# Patient Record
Sex: MALE | Race: BLACK OR AFRICAN AMERICAN | NOT HISPANIC OR LATINO | Employment: STUDENT | URBAN - METROPOLITAN AREA
[De-identification: names, ages, dates, MRNs, and addresses within clinical notes are randomized per-mention and may not be internally consistent; named-entity substitution may affect disease eponyms.]

---
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
## 2024-07-02 ENCOUNTER — PATIENT OUTREACH (OUTPATIENT)
Dept: CARE COORDINATION | Facility: CLINIC | Age: 19
End: 2024-07-02
Payer: COMMERCIAL

## 2024-09-14 ENCOUNTER — HEALTH MAINTENANCE LETTER (OUTPATIENT)
Age: 19
End: 2024-09-14

## 2024-09-19 NOTE — PATIENT INSTRUCTIONS
"Acne vulgaris    Morning regimen:    *Wash with either a gentle cleanser such as Cetaphil or Cera Ve unmedicated gentle cleanser.     *Then apply clindamycin 1% lotion to the entire face, as well as chest and back if affected.     *Apply a gentle moisturizer with SPF 30+ that says either \"noncomedogenic\" or \"oil free\" meaning it won't clog pores.     Evening regimen:    *Wash with either a gentle cleanser such as Cetaphil gentle cleanser     *Allow skin to fully dry again before applying medications. Applying retinods to moist skin will cause added dryness.     *Apply prescription retinoid (tretinoin, adapalene or Differin. Whichever you were prescribed) A pea sized amount will be enough for the entire face. More is not better, it will just add to the dryness. Apply to chest and back areas as well if affected. This is not a spot treatment so make sure you're covering the entire area that is affected by acne. When you're first starting a retinoid you WILL be dry and may have some flaking of the skin. This is an expected response so keep using the medication. If the dryness is not controlled with moisturizing often, you may want to decrease how often you use it temporarially and slowly increase the frequency to nightly use as the dryness subsides. For most people I would recommend starting use every 3rd night for a few weeks, then increasing to every other night for a few weeks then when you feel like you can tolerate it go up to nightly use. This should help the dryness.     *Apply a gentle moisturizing CREAM. Cera Ve cream, Cetaphil Cream, or Vanicream are good options.         It may take 2-3 months to see 50-70% improvement. It is important to give the topical medications time to clean out your pores and prevent new acne from forming. Sometimes people flare with more acne after starting a new regimen and this is okay. If the flare is severe please call our office and speak with a nurse about your acne.  Otherwise, " please continue use of this regimen for the next 3 months and come back for a reevaluation with me and we can adjust your plan at that time.       ------------------------------------------------------------------------------------    # tinea versicolor  Start ketoconazole 2% shampoo to use daily as a body wash to the neck, chest and back until rash is fully resolved. May use as maintenance to keep rash from recurring 2-3 times weekly if rash seems to come and go, or use with rash recurrence only. Shampoo may be drying, okay to moisturize after bathing or throughout the day for irritation. Sun exposure/tan skin will make rash more obvious as affected areas may stay hypopigmented. Advised not to wear shirts more than once without washing while rash is present.

## 2024-09-20 ENCOUNTER — OFFICE VISIT (OUTPATIENT)
Dept: DERMATOLOGY | Facility: CLINIC | Age: 19
End: 2024-09-20
Payer: COMMERCIAL

## 2024-09-20 DIAGNOSIS — B36.0 TV (TINEA VERSICOLOR): ICD-10-CM

## 2024-09-20 DIAGNOSIS — L70.0 ACNE VULGARIS: Primary | ICD-10-CM

## 2024-09-20 PROCEDURE — G2211 COMPLEX E/M VISIT ADD ON: HCPCS | Performed by: NURSE PRACTITIONER

## 2024-09-20 PROCEDURE — 99214 OFFICE O/P EST MOD 30 MIN: CPT | Performed by: NURSE PRACTITIONER

## 2024-09-20 RX ORDER — TRETINOIN 0.25 MG/G
CREAM TOPICAL
Qty: 45 G | Refills: 11 | Status: CANCELLED | OUTPATIENT
Start: 2024-09-20

## 2024-09-20 RX ORDER — TRETINOIN 0.25 MG/G
CREAM TOPICAL
Qty: 45 G | Refills: 11 | Status: SHIPPED | OUTPATIENT
Start: 2024-09-20

## 2024-09-20 RX ORDER — KETOCONAZOLE 20 MG/ML
SHAMPOO TOPICAL
Qty: 120 ML | Refills: 11 | Status: SHIPPED | OUTPATIENT
Start: 2024-09-20

## 2024-09-20 RX ORDER — CLINDAMYCIN PHOSPHATE 10 UG/ML
LOTION TOPICAL EVERY MORNING
Qty: 60 ML | Refills: 11 | Status: SHIPPED | OUTPATIENT
Start: 2024-09-20 | End: 2024-10-01

## 2024-09-20 NOTE — PROGRESS NOTES
[facility] Dermatology Note  Encounter Date: Sep 20, 2024  Office Visit     Reviewed patients past medical history and pertinent chart review prior to patients visit today.     Dermatology Problem List:  Acne  -Previously had been on doxycycline 100 mg twice daily and benzyl peroxide wash which have both been discontinued  -Current treatment includes clindamycin lotion, tretinoin 0.025% cream  Tinea versicolor  -ketoconazole shampoo  ____________________________________________    Assessment & Plan:     # Acne vulgaris    -Continue clindamycin lotion once daily in the am.  -restart either adapalene 0.1% gel or tretinoin 0.025% cream. Apply once every other day and increase to nightly as tolerate.  Waiting a few minutes after washing affected areas will decrease irritation. Method of application, side effects and expected results were discussed. The patient will apply pea size amount to the entire face, avoid areas around the eyes, corners of nose and mouth. Discussed side effects including photosensitivity and irritation.    -start gentle cleanser  -start SPF moisturizer. Samples given.     # tinea versicolor  Discussed diagnosis and treatment options with patient. Prescribed ketoconazole 2% shampoo to use daily as a body wash to the neck, chest and back until rash is fully resolved. May use as maintenance to keep rash from recurring 2-3 times weekly if rash seems to come and go, or use with rash recurrence only. Shampoo may be drying, okay to moisturize after bathing or throughout the day for irritation. Sun exposure/tan skin will make rash more obvious as affected areas may stay hypopigmented. Advised not to wear shirts/bras more than once without washing while rash is present.      Follow-up: 3 months for follow up if not well controlled. 1 year if well controlled on topical therapy only, sooner PRN new concerns    Nany Saavedra CNP  Dermatology   _______________________________________    CC: No  chief complaint on file.    HPI:  Mr. Rani Singh is a(n) 18 year old male who presents today for follow-up  for acne. He saw Rachel Ashford in November 2022 and was started on doxycycline 100 mg BID, tretinoin 0.025% cream and BPO wash.  At follow-up with me on 4/14/2023 I had him taper off of the doxycycline, continue the tretinoin 0.025% cream and start clindamycin lotion.  Advised to discontinue the benzyl peroxide wash as I thought that may have been causing the hyperpigmentation he was concerned about.  Patient feels that his acne is still bothersome but he has stopped all of the medications for awhile. He wasn't able to use the tretinoin very long due to irritation. He thought clindamycin was helpful but is out of it.     He also has discolored spots on the upper back, upper shoulders/arms and chest.  He does a lot of exercise and working out daily.  It is asymptomatic.      Patient is otherwise feeling well, without additional skin concerns.      Physical Exam:  Vitals: There were no vitals taken for this visit.  SKIN: Acne exam, which includes the face, neck, upper central chest, and upper central back was performed.  - open and closed comedones scattered on the forehead and chin  -Inflammatory papules on the chin and forehead  -hyperpigmented annular slightly scaly patches on chest, shoulders, upper arms and upper back    - No other lesions of concern on areas examined.     Medications:  Current Outpatient Medications   Medication Sig Dispense Refill    acetaminophen (MAPAP ARTHRITIS PAIN) 650 MG CR tablet TAKE 1 TABLET (650 MG) BY MOUTH EVERY 8 HOURS AS NEEDED FOR MILD PAIN OR FEVER 60 tablet 1    acyclovir (ZOVIRAX) 400 MG tablet Take 1 tablet (400 mg) by mouth every 12 hours 180 tablet 3    acyclovir (ZOVIRAX) 5 % external cream APPLY TOPICALLY 5 TIMES A DAY AS NEEDED FOR COLD SORE 5 g 3    ammonium lactate (AMLACTIN) 12 % external cream Apply daily to arms. 385 g 4    azelastine (ASTELIN) 0.1 %  nasal spray Spray 1 spray into both nostrils 2 times daily 30 mL 4    benzoyl peroxide 5 % external liquid Apply topically 2 times daily Wash face and pat dry twice daily 236 mL 4    clindamycin (CLEOCIN T) 1 % external lotion Apply topically every morning To all areas of acne 60 mL 11    doxycycline hyclate (VIBRAMYCIN) 100 MG capsule Take 1 capsule (100 mg) by mouth 2 times daily 180 capsule 0    erythromycin with ethanol (EMGEL) 2 % gel Apply topically 2 times daily 60 g 4    Pediatric Multiple Vitamins (CHILDRENS MULTI-VITAMINS OR) Take 1 tablet by mouth daily.      tretinoin (RETIN-A) 0.025 % external cream Use every night to all areas of acne 45 g 11    tretinoin (RETIN-A) 0.025 % external cream Apply pea sized amount to face at bedtime. 45 g 5    vitamin D2 (ERGOCALCIFEROL) 53936 units (1250 mcg) capsule Take 1 capsule (50,000 Units) by mouth once a week 12 capsule 1     No current facility-administered medications for this visit.      Past Medical History:   Patient Active Problem List   Diagnosis    Ocular hypertension, bilateral    Right foot pain    Short stature     Past Medical History:   Diagnosis Date    Allergic rhinitis 10/27/2014    Problem list name updated by automated process. Provider to review    Amblyopia     patched as a child    Astigmatism     High myopia     Strabismus        CC No referring provider defined for this encounter. on close of this encounter.

## 2024-09-20 NOTE — LETTER
9/20/2024      Rnai Singh  [address]  [place] [address]      Dear Colleague,    Thank you for referring your patient, Rani Singh, to the [facility] [place]. Please see a copy of my visit note below.    [facility] Dermatology Note  Encounter Date: Sep 20, 2024  Office Visit     Reviewed patients past medical history and pertinent chart review prior to patients visit today.     Dermatology Problem List:  Acne  -Previously had been on doxycycline 100 mg twice daily and benzyl peroxide wash which have both been discontinued  -Current treatment includes clindamycin lotion, tretinoin 0.025% cream  Tinea versicolor  -ketoconazole shampoo  ____________________________________________    Assessment & Plan:     # Acne vulgaris    -Continue clindamycin lotion once daily in the am.  -restart either adapalene 0.1% gel or tretinoin 0.025% cream. Apply once every other day and increase to nightly as tolerate.  Waiting a few minutes after washing affected areas will decrease irritation. Method of application, side effects and expected results were discussed. The patient will apply pea size amount to the entire face, avoid areas around the eyes, corners of nose and mouth. Discussed side effects including photosensitivity and irritation.    -start gentle cleanser  -start SPF moisturizer. Samples given.     # tinea versicolor  Discussed diagnosis and treatment options with patient. Prescribed ketoconazole 2% shampoo to use daily as a body wash to the neck, chest and back until rash is fully resolved. May use as maintenance to keep rash from recurring 2-3 times weekly if rash seems to come and go, or use with rash recurrence only. Shampoo may be drying, okay to moisturize after bathing or throughout the day for irritation. Sun exposure/tan skin will make rash more obvious as affected areas may stay hypopigmented. Advised not to wear shirts/bras more than once without washing while rash  is present.      Follow-up: 3 months for follow up if not well controlled. 1 year if well controlled on topical therapy only, sooner PRN new concerns    Nany Saavedra CNP  Dermatology   _______________________________________    CC: No chief complaint on file.    HPI:  Mr. Rani Singh is a(n) 18 year old male who presents today for follow-up  for acne. He saw Rachel Ashford in November 2022 and was started on doxycycline 100 mg BID, tretinoin 0.025% cream and BPO wash.  At follow-up with me on 4/14/2023 I had him taper off of the doxycycline, continue the tretinoin 0.025% cream and start clindamycin lotion.  Advised to discontinue the benzyl peroxide wash as I thought that may have been causing the hyperpigmentation he was concerned about.  Patient feels that his acne is still bothersome but he has stopped all of the medications for awhile. He wasn't able to use the tretinoin very long due to irritation. He thought clindamycin was helpful but is out of it.     He also has discolored spots on the upper back, upper shoulders/arms and chest.  He does a lot of exercise and working out daily.  It is asymptomatic.      Patient is otherwise feeling well, without additional skin concerns.      Physical Exam:  Vitals: There were no vitals taken for this visit.  SKIN: Acne exam, which includes the face, neck, upper central chest, and upper central back was performed.  - open and closed comedones scattered on the forehead and chin  -Inflammatory papules on the chin and forehead  -hyperpigmented annular slightly scaly patches on chest, shoulders, upper arms and upper back    - No other lesions of concern on areas examined.     Medications:  Current Outpatient Medications   Medication Sig Dispense Refill     acetaminophen (MAPAP ARTHRITIS PAIN) 650 MG CR tablet TAKE 1 TABLET (650 MG) BY MOUTH EVERY 8 HOURS AS NEEDED FOR MILD PAIN OR FEVER 60 tablet 1     acyclovir (ZOVIRAX) 400 MG tablet Take 1 tablet (400 mg) by mouth every  12 hours 180 tablet 3     acyclovir (ZOVIRAX) 5 % external cream APPLY TOPICALLY 5 TIMES A DAY AS NEEDED FOR COLD SORE 5 g 3     ammonium lactate (AMLACTIN) 12 % external cream Apply daily to arms. 385 g 4     azelastine (ASTELIN) 0.1 % nasal spray Spray 1 spray into both nostrils 2 times daily 30 mL 4     benzoyl peroxide 5 % external liquid Apply topically 2 times daily Wash face and pat dry twice daily 236 mL 4     clindamycin (CLEOCIN T) 1 % external lotion Apply topically every morning To all areas of acne 60 mL 11     doxycycline hyclate (VIBRAMYCIN) 100 MG capsule Take 1 capsule (100 mg) by mouth 2 times daily 180 capsule 0     erythromycin with ethanol (EMGEL) 2 % gel Apply topically 2 times daily 60 g 4     Pediatric Multiple Vitamins (CHILDRENS MULTI-VITAMINS OR) Take 1 tablet by mouth daily.       tretinoin (RETIN-A) 0.025 % external cream Use every night to all areas of acne 45 g 11     tretinoin (RETIN-A) 0.025 % external cream Apply pea sized amount to face at bedtime. 45 g 5     vitamin D2 (ERGOCALCIFEROL) 86067 units (1250 mcg) capsule Take 1 capsule (50,000 Units) by mouth once a week 12 capsule 1     No current facility-administered medications for this visit.      Past Medical History:   Patient Active Problem List   Diagnosis     Ocular hypertension, bilateral     Right foot pain     Short stature     Past Medical History:   Diagnosis Date     Allergic rhinitis 10/27/2014    Problem list name updated by automated process. Provider to review     Amblyopia     patched as a child     Astigmatism      High myopia      Strabismus        CC No referring provider defined for this encounter. on close of this encounter.       Again, thank you for allowing me to participate in the care of your patient.        Sincerely,        Rhoda Saavedra, SAL CNP

## 2024-10-01 ENCOUNTER — OFFICE VISIT (OUTPATIENT)
Dept: FAMILY MEDICINE | Facility: CLINIC | Age: 19
End: 2024-10-01
Payer: COMMERCIAL

## 2024-10-01 VITALS
HEART RATE: 55 BPM | WEIGHT: 204.38 LBS | TEMPERATURE: 97.5 F | OXYGEN SATURATION: 99 % | DIASTOLIC BLOOD PRESSURE: 74 MMHG | HEIGHT: 67 IN | SYSTOLIC BLOOD PRESSURE: 131 MMHG | BODY MASS INDEX: 32.08 KG/M2 | RESPIRATION RATE: 18 BRPM

## 2024-10-01 DIAGNOSIS — Z23 ENCOUNTER FOR IMMUNIZATION: ICD-10-CM

## 2024-10-01 DIAGNOSIS — E78.5 HYPERLIPIDEMIA LDL GOAL <100: ICD-10-CM

## 2024-10-01 DIAGNOSIS — G47.00 INSOMNIA, UNSPECIFIED TYPE: Primary | ICD-10-CM

## 2024-10-01 DIAGNOSIS — R53.83 FATIGUE, UNSPECIFIED TYPE: ICD-10-CM

## 2024-10-01 DIAGNOSIS — E66.9 OBESITY, UNSPECIFIED CLASS, UNSPECIFIED OBESITY TYPE, UNSPECIFIED WHETHER SERIOUS COMORBIDITY PRESENT: ICD-10-CM

## 2024-10-01 DIAGNOSIS — R73.01 IMPAIRED FASTING GLUCOSE: ICD-10-CM

## 2024-10-01 LAB
ALBUMIN SERPL BCG-MCNC: 4.3 G/DL (ref 3.5–5.2)
ALP SERPL-CCNC: 111 U/L (ref 65–260)
ALT SERPL W P-5'-P-CCNC: 26 U/L (ref 0–50)
ANION GAP SERPL CALCULATED.3IONS-SCNC: 9 MMOL/L (ref 7–15)
AST SERPL W P-5'-P-CCNC: 38 U/L (ref 0–35)
BASOPHILS # BLD AUTO: 0 10E3/UL (ref 0–0.2)
BASOPHILS NFR BLD AUTO: 0 %
BILIRUB SERPL-MCNC: 0.5 MG/DL
BUN SERPL-MCNC: 11.7 MG/DL (ref 6–20)
CALCIUM SERPL-MCNC: 9.3 MG/DL (ref 8.8–10.4)
CHLORIDE SERPL-SCNC: 105 MMOL/L (ref 98–107)
CHOLEST SERPL-MCNC: 135 MG/DL
CREAT SERPL-MCNC: 1.15 MG/DL (ref 0.67–1.17)
EGFRCR SERPLBLD CKD-EPI 2021: >90 ML/MIN/1.73M2
EOSINOPHIL # BLD AUTO: 0.1 10E3/UL (ref 0–0.7)
EOSINOPHIL NFR BLD AUTO: 2 %
ERYTHROCYTE [DISTWIDTH] IN BLOOD BY AUTOMATED COUNT: 13 % (ref 10–15)
EST. AVERAGE GLUCOSE BLD GHB EST-MCNC: 128 MG/DL
FASTING STATUS PATIENT QL REPORTED: YES
FASTING STATUS PATIENT QL REPORTED: YES
GLUCOSE SERPL-MCNC: 118 MG/DL (ref 70–99)
HBA1C MFR BLD: 6.1 % (ref 0–5.6)
HCO3 SERPL-SCNC: 26 MMOL/L (ref 22–29)
HCT VFR BLD AUTO: 44.3 % (ref 40–53)
HDLC SERPL-MCNC: 48 MG/DL
HGB BLD-MCNC: 14.6 G/DL (ref 13.3–17.7)
IMM GRANULOCYTES # BLD: 0 10E3/UL
IMM GRANULOCYTES NFR BLD: 0 %
LDLC SERPL CALC-MCNC: 74 MG/DL
LYMPHOCYTES # BLD AUTO: 3 10E3/UL (ref 0.8–5.3)
LYMPHOCYTES NFR BLD AUTO: 51 %
MCH RBC QN AUTO: 29 PG (ref 26.5–33)
MCHC RBC AUTO-ENTMCNC: 33 G/DL (ref 31.5–36.5)
MCV RBC AUTO: 88 FL (ref 78–100)
MONOCYTES # BLD AUTO: 0.6 10E3/UL (ref 0–1.3)
MONOCYTES NFR BLD AUTO: 10 %
NEUTROPHILS # BLD AUTO: 2.2 10E3/UL (ref 1.6–8.3)
NEUTROPHILS NFR BLD AUTO: 37 %
NONHDLC SERPL-MCNC: 87 MG/DL
NRBC # BLD AUTO: 0 10E3/UL
NRBC BLD AUTO-RTO: 0 /100
PLATELET # BLD AUTO: 200 10E3/UL (ref 150–450)
POTASSIUM SERPL-SCNC: 4.3 MMOL/L (ref 3.4–5.3)
PROT SERPL-MCNC: 6.9 G/DL (ref 6.4–8.3)
RBC # BLD AUTO: 5.04 10E6/UL (ref 4.4–5.9)
SODIUM SERPL-SCNC: 140 MMOL/L (ref 135–145)
TRIGL SERPL-MCNC: 64 MG/DL
TSH SERPL DL<=0.005 MIU/L-ACNC: 2.61 UIU/ML (ref 0.5–4.3)
WBC # BLD AUTO: 5.9 10E3/UL (ref 4–11)

## 2024-10-01 PROCEDURE — 83036 HEMOGLOBIN GLYCOSYLATED A1C: CPT | Performed by: FAMILY MEDICINE

## 2024-10-01 PROCEDURE — 80061 LIPID PANEL: CPT | Performed by: FAMILY MEDICINE

## 2024-10-01 PROCEDURE — 85025 COMPLETE CBC W/AUTO DIFF WBC: CPT | Performed by: FAMILY MEDICINE

## 2024-10-01 PROCEDURE — 99214 OFFICE O/P EST MOD 30 MIN: CPT | Mod: 25 | Performed by: FAMILY MEDICINE

## 2024-10-01 PROCEDURE — 36415 COLL VENOUS BLD VENIPUNCTURE: CPT | Performed by: FAMILY MEDICINE

## 2024-10-01 PROCEDURE — 90656 IIV3 VACC NO PRSV 0.5 ML IM: CPT | Performed by: FAMILY MEDICINE

## 2024-10-01 PROCEDURE — 90471 IMMUNIZATION ADMIN: CPT | Performed by: FAMILY MEDICINE

## 2024-10-01 PROCEDURE — 80053 COMPREHEN METABOLIC PANEL: CPT | Performed by: FAMILY MEDICINE

## 2024-10-01 PROCEDURE — 84443 ASSAY THYROID STIM HORMONE: CPT | Performed by: FAMILY MEDICINE

## 2024-10-01 ASSESSMENT — PAIN SCALES - GENERAL: PAINLEVEL: NO PAIN (0)

## 2024-10-01 NOTE — PATIENT INSTRUCTIONS
Increase exercise    Could try melatonin in evening, 1 to 10 mg ( work up on dose slowly )    Advise reading the book Why We Sleep by Eric Cronin    We will send you lab results    Can schedule consult with sleep specialist

## 2024-10-02 NOTE — RESULT ENCOUNTER NOTE
The diabetes test ( hemoglobin a1c ) is better than last year.  No medications needed.  Keep working on healthy diet/exercise and weight loss.    One liver test ( AST ) just slightly above normal range.  Not likely worrisome.  The other liver tests are fine.    Other labs normal.    Carlos Valencia MD

## 2024-10-08 ENCOUNTER — OFFICE VISIT (OUTPATIENT)
Dept: FAMILY MEDICINE | Facility: CLINIC | Age: 19
End: 2024-10-08
Payer: COMMERCIAL

## 2024-10-08 ENCOUNTER — OFFICE VISIT (OUTPATIENT)
Dept: OPHTHALMOLOGY | Facility: CLINIC | Age: 19
End: 2024-10-08
Payer: COMMERCIAL

## 2024-10-08 VITALS
BODY MASS INDEX: 31.42 KG/M2 | HEART RATE: 52 BPM | OXYGEN SATURATION: 99 % | WEIGHT: 200.2 LBS | RESPIRATION RATE: 20 BRPM | DIASTOLIC BLOOD PRESSURE: 59 MMHG | TEMPERATURE: 97.6 F | SYSTOLIC BLOOD PRESSURE: 113 MMHG | HEIGHT: 67 IN

## 2024-10-08 DIAGNOSIS — Z11.4 SCREENING FOR HIV (HUMAN IMMUNODEFICIENCY VIRUS): Primary | ICD-10-CM

## 2024-10-08 DIAGNOSIS — R51.9 SINUS HEADACHE: ICD-10-CM

## 2024-10-08 DIAGNOSIS — H52.13 MYOPIA WITH ASTIGMATISM, BILATERAL: ICD-10-CM

## 2024-10-08 DIAGNOSIS — Z86.19 HX OF COLD SORES: ICD-10-CM

## 2024-10-08 DIAGNOSIS — Z01.00 EXAMINATION OF EYES AND VISION: ICD-10-CM

## 2024-10-08 DIAGNOSIS — Z11.59 NEED FOR HEPATITIS C SCREENING TEST: ICD-10-CM

## 2024-10-08 DIAGNOSIS — Z00.129 ENCOUNTER FOR ROUTINE CHILD HEALTH EXAMINATION W/O ABNORMAL FINDINGS: ICD-10-CM

## 2024-10-08 DIAGNOSIS — H52.203 MYOPIA WITH ASTIGMATISM, BILATERAL: ICD-10-CM

## 2024-10-08 DIAGNOSIS — H40.053 OCULAR HYPERTENSION, BILATERAL: Primary | ICD-10-CM

## 2024-10-08 PROCEDURE — 92015 DETERMINE REFRACTIVE STATE: CPT | Performed by: OPHTHALMOLOGY

## 2024-10-08 PROCEDURE — 92014 COMPRE OPH EXAM EST PT 1/>: CPT | Performed by: OPHTHALMOLOGY

## 2024-10-08 PROCEDURE — 99395 PREV VISIT EST AGE 18-39: CPT | Performed by: INTERNAL MEDICINE

## 2024-10-08 RX ORDER — SENNOSIDES 8.6 MG
650 CAPSULE ORAL EVERY 8 HOURS PRN
Qty: 60 TABLET | Refills: 1 | OUTPATIENT
Start: 2024-10-08

## 2024-10-08 RX ORDER — SENNOSIDES 8.6 MG
650 CAPSULE ORAL EVERY 8 HOURS PRN
Qty: 60 TABLET | Refills: 1 | Status: SHIPPED | OUTPATIENT
Start: 2024-10-08

## 2024-10-08 RX ORDER — ACYCLOVIR 50 MG/G
CREAM TOPICAL
Qty: 5 G | Refills: 3 | Status: SHIPPED | OUTPATIENT
Start: 2024-10-08

## 2024-10-08 SDOH — HEALTH STABILITY: PHYSICAL HEALTH: ON AVERAGE, HOW MANY DAYS PER WEEK DO YOU ENGAGE IN MODERATE TO STRENUOUS EXERCISE (LIKE A BRISK WALK)?: 5 DAYS

## 2024-10-08 ASSESSMENT — REFRACTION_WEARINGRX
OS_AXIS: 072
OS_SPHERE: -4.25
OD_SPHERE: -7.00
OD_AXIS: 097
SPECS_TYPE: SVL
OS_CYLINDER: +2.00
OD_CYLINDER: +3.00

## 2024-10-08 ASSESSMENT — CONF VISUAL FIELD
OS_SUPERIOR_NASAL_RESTRICTION: 0
OD_INFERIOR_NASAL_RESTRICTION: 0
OD_INFERIOR_TEMPORAL_RESTRICTION: 0
METHOD: COUNTING FINGERS
OD_SUPERIOR_NASAL_RESTRICTION: 0
OS_INFERIOR_NASAL_RESTRICTION: 0
OD_NORMAL: 1
OS_NORMAL: 1
OS_INFERIOR_TEMPORAL_RESTRICTION: 0
OS_SUPERIOR_TEMPORAL_RESTRICTION: 0
OD_SUPERIOR_TEMPORAL_RESTRICTION: 0

## 2024-10-08 ASSESSMENT — EXTERNAL EXAM - LEFT EYE: OS_EXAM: NORMAL

## 2024-10-08 ASSESSMENT — VISUAL ACUITY
OD_CC: 20/25
METHOD: SNELLEN - LINEAR
CORRECTION_TYPE: GLASSES
OS_CC+: -1
OS_CC: 20/20
OD_CC+: -2

## 2024-10-08 ASSESSMENT — REFRACTION_MANIFEST
OS_SPHERE: -4.50
OD_CYLINDER: +3.25
OS_AXIS: 075
OD_AXIS: 098
OD_SPHERE: -6.50
OS_CYLINDER: +2.00

## 2024-10-08 ASSESSMENT — CUP TO DISC RATIO
OD_RATIO: 0.3
OS_RATIO: 0.3

## 2024-10-08 ASSESSMENT — SLIT LAMP EXAM - LIDS
COMMENTS: NORMAL
COMMENTS: NORMAL

## 2024-10-08 ASSESSMENT — TONOMETRY
OS_IOP_MMHG: 21
OD_IOP_MMHG: 21
IOP_METHOD: APPLANATION

## 2024-10-08 ASSESSMENT — SOCIAL DETERMINANTS OF HEALTH (SDOH): HOW OFTEN DO YOU GET TOGETHER WITH FRIENDS OR RELATIVES?: ONCE A WEEK

## 2024-10-08 ASSESSMENT — EXTERNAL EXAM - RIGHT EYE: OD_EXAM: NORMAL

## 2024-10-08 NOTE — PATIENT INSTRUCTIONS
"Glasses prescription given - optional    May use artificial tears up to four times a day (like Refresh Optive, Systane Balance, or TheraTears. Avoid \"get the red out\" drops and generic artifical tears).     Return visit in December 2025 for an intraocular pressure check, glaucoma OCT, retinal OCT, and Ureña Visual Field.     Dr. Fuentes [phone]    "

## 2024-10-08 NOTE — LETTER
"10/8/2024      Rani Singh  [address]  [place] [address]      Dear Colleague,    Thank you for referring your patient, Rani Singh, to the [facility]. Please see a copy of my visit note below.     Current Eye Medications:  None     Subjective:  Complete eye exam. Vision is doing OK both eyes distance and near. Broke glasses yesterday and had to superglue. No eye pain or discomfort in either eye.      Objective:  See Ophthalmology Exam.       Assessment:  Stable eye exam.  Stable intraocular pressures and discs.      ICD-10-CM    1. Ocular hypertension, bilateral  H40.053       2. Examination of eyes and vision  Z01.00       3. Myopia with astigmatism, bilateral  H52.13     H52.203            Plan:  Glasses prescription given - optional    May use artificial tears up to four times a day (like Refresh Optive, Systane Balance, or TheraTears. Avoid \"get the red out\" drops and generic artifical tears).     Return visit in December 2025 for an intraocular pressure check, glaucoma OCT, retinal OCT, and Ureña Visual Field.     Dr. Fuentes [phone]         Again, thank you for allowing me to participate in the care of your patient.        Sincerely,        Tristen Fuentes MD  "

## 2024-10-08 NOTE — PROGRESS NOTES
" Current Eye Medications:  None     Subjective:  Complete eye exam. Vision is doing OK both eyes distance and near. Broke glasses yesterday and had to superglue. No eye pain or discomfort in either eye.      Objective:  See Ophthalmology Exam.       Assessment:  Stable eye exam.  Stable intraocular pressures and discs.      ICD-10-CM    1. Ocular hypertension, bilateral  H40.053       2. Examination of eyes and vision  Z01.00       3. Myopia with astigmatism, bilateral  H52.13     H52.203            Plan:  Glasses prescription given - optional    May use artificial tears up to four times a day (like Refresh Optive, Systane Balance, or TheraTears. Avoid \"get the red out\" drops and generic artifical tears).     Return visit in December 2025 for an intraocular pressure check, glaucoma OCT, retinal OCT, and Ureña Visual Field.     Dr. Fuentes [phone]         "

## 2024-10-08 NOTE — PATIENT INSTRUCTIONS
Patient Education    [facility] HANDOUT- PATIENT  18 THROUGH 21 YEAR VISITS  Here are some suggestions from [employer] experts that may be of value to your family.     HOW YOU ARE DOING  Enjoy spending time with your family.  Find activities you are really interested in, such as sports, theater, or volunteering.  Try to be responsible for your schoolwork or work obligations.  Always talk through problems and never use violence.  If you get angry with someone, try to walk away.  If you feel unsafe in your home or have been hurt by someone, let us know. Hotlines and community agencies can also provide confidential help.  Talk with us if you are worried about your living or food situation. Community agencies and programs such as SNAP can help.  Don t smoke, vape, or use drugs. Avoid people who do when you can. Talk with us if you are worried about alcohol or drug use in your family.    YOUR DAILY LIFE  Visit the dentist at least twice a year.  Brush your teeth at least twice a day and floss once a day.  Be a healthy eater.  Have vegetables, fruits, lean protein, and whole grains at meals and snacks.  Limit fatty, sugary, salty foods that are low in nutrients, such as candy, chips, and ice cream.  Eat when you re hungry. Stop when you feel satisfied.  Eat breakfast.  Drink plenty of water.  Make sure to get enough calcium every day.  Have 3 or more servings of low-fat (1%) or fat-free milk and other low-fat dairy products, such as yogurt and cheese.  Women: Make sure to eat foods rich in folate, such as fortified grains and dark- green leafy vegetables.  Aim for at least 1 hour of physical activity every day.  Wear safety equipment when you play sports.  Get enough sleep.  Talk with us about managing your health care and insurance as an adult.    YOUR FEELINGS  Most people have ups and downs. If you are feeling sad, depressed, nervous, irritable, hopeless, or angry, let us know or reach out to another health  care professional.  Figure out healthy ways to deal with stress.  Try your best to solve problems and make decisions on your own.  Sexuality is an important part of your life. If you have any questions or concerns, we are here for you.    HEALTHY BEHAVIOR CHOICES  Avoid using drugs, alcohol, tobacco, steroids, and diet pills. Support friends who choose not to use.  If you use drugs or alcohol, let us know or talk with another trusted adult about it. We can help you with quitting or cutting down on your use.  Make healthy decisions about your sexual behavior.  If you are sexually active, always practice safe sex. Always use birth control along with a condom to prevent pregnancy and sexually transmitted infections.  All sexual activity should be something you want. No one should ever force or try to convince you.  Protect your hearing at work, home, and concerts. Keep your earbud volume down.    STAYING SAFE  Always be a safe and cautious .  Insist that everyone use a lap and shoulder seat belt.  Limit the number of friends in the car and avoid driving at night.  Avoid distractions. Never text or talk on the phone while you drive.  Do not ride in a vehicle with someone who has been using drugs or alcohol.  If you feel unsafe driving or riding with someone, call someone you trust to drive you.  Wear helmets and protective gear while playing sports. Wear a helmet when riding a bike, a motorcycle, or an ATV or when skiing or skateboarding.  Always use sunscreen and a hat when you re outside.  Fighting and carrying weapons can be dangerous. Talk with your parents, teachers, or doctor about how to avoid these situations.        Consistent with Bright Futures: Guidelines for Health Supervision of Infants, Children, and Adolescents, 4th Edition  For more information, go to https://brightfutures.aap.org.

## 2024-10-08 NOTE — PROGRESS NOTES
"Preventive Care Visit  [facility] ZAIDA Betancourt MD, Internal Medicine - Pediatrics  Oct 8, 2024      Assessment & Plan   Problem List Items Addressed This Visit    None  Visit Diagnoses       Screening for HIV (human immunodeficiency virus)    -  Primary    Sinus headache        Relevant Medications    acetaminophen (MAPAP ARTHRITIS PAIN) 650 MG CR tablet    Hx of cold sores        Relevant Medications    acyclovir (ZOVIRAX) 5 % external cream    Need for hepatitis C screening test        Encounter for routine child health examination w/o abnormal findings        Relevant Orders    BEHAVIORAL/EMOTIONAL ASSESSMENT (00186) (Completed)    SCREENING TEST, PURE TONE, AIR ONLY (Completed)    SCREENING, VISUAL ACUITY, QUANTITATIVE, BILAT (Completed)                    BMI  Estimated body mass index is 31.06 kg/m  as calculated from the following:    Height as of this encounter: 1.71 m (5' 7.32\").    Weight as of this encounter: 90.8 kg (200 lb 3.2 oz).   Weight management plan: Discussed healthy diet and exercise guidelines    Counseling  Appropriate preventive services were addressed with this patient via screening, questionnaire, or discussion as appropriate for fall prevention, nutrition, physical activity, Tobacco-use cessation, social engagement, weight loss and cognition.  Checklist reviewing preventive services available has been given to the patient.  Reviewed patient's diet, addressing concerns and/or questions.     Work on weight loss  Regular exercise      Subjective   Shamarr is a 18 year old, presenting for the following:  Physical (annual)        10/8/2024    10:01 AM   Additional Questions   Roomed by krissy        Health Care Directive  Patient does not have a Health Care Directive or Living Will: Discussed advance care planning with patient; information given to patient to review.    HPI              10/8/2024   General Health   How would you rate your overall physical health? " Good   Feel stress (tense, anxious, or unable to sleep) To some extent      (!) STRESS CONCERN      10/8/2024   Nutrition   Three or more servings of calcium each day? Yes   Diet: Regular (no restrictions)   How many servings of fruit and vegetables per day? (!) 2-3   How many sweetened beverages each day? 0-1            10/8/2024   Exercise   Days per week of moderate/strenous exercise 5 days            10/8/2024   Social Factors   Frequency of gathering with friends or relatives Once a week   Worry food won't last until get money to buy more No   Food not last or not have enough money for food? No   Do you have housing? (Housing is defined as stable permanent housing and does not include staying ouside in a car, in a tent, in an abandoned building, in an overnight shelter, or couch-surfing.) Yes   Are you worried about losing your housing? No   Lack of transportation? No   Unable to get utilities (heat,electricity)? No            10/8/2024   Dental   Dentist two times every year? Yes            10/8/2024   TB Screening   Were you born outside of the [place]? No              Today's PHQ-2 Score:       9/20/2024     2:48 PM   PHQ-2 ( 1999 [employer])   Q1: Little interest or pleasure in doing things 0   Q2: Feeling down, depressed or hopeless 0   PHQ-2 Score 0         10/8/2024   Substance Use   Alcohol more than 3/day or more than 7/wk Not Applicable   Do you use any other substances recreationally? No        Social History     Tobacco Use    Smoking status: Never     Passive exposure: Never    Smokeless tobacco: Never   Vaping Use    Vaping status: Never Used   Substance Use Topics    Alcohol use: No    Drug use: No             10/8/2024   One time HIV Screening   Previous HIV test? No          10/8/2024   STI Screening   New sexual partner(s) since last STI/HIV test? No            10/8/2024   Contraception/Family Planning   Questions about contraception or family planning No           Reviewed and updated as needed this  visit by Provider                    Lab work is in process  Labs reviewed in EPIC  BP Readings from Last 3 Encounters:   10/08/24 113/59   10/01/24 131/74   01/04/23 130/79    Wt Readings from Last 3 Encounters:   10/08/24 90.8 kg (200 lb 3.2 oz) (92%, Z= 1.41)*   10/01/24 92.7 kg (204 lb 6 oz) (94%, Z= 1.52)*   01/04/23 97.3 kg (214 lb 8 oz) (97%, Z= 1.88)*     * Growth percentiles are based on [facility] (Boys, 2-20 Years) data.                  Patient Active Problem List   Diagnosis    Ocular hypertension, bilateral    Right foot pain    Short stature     Past Surgical History:   Procedure Laterality Date    TONSILLECTOMY & ADENOIDECTOMY      age 2       Social History     Tobacco Use    Smoking status: Never     Passive exposure: Never    Smokeless tobacco: Never   Substance Use Topics    Alcohol use: No     Family History   Problem Relation Age of Onset    Diabetes Mother     Neurologic Disorder Father 25        migraines    Glaucoma Maternal Grandmother     Hypertension Maternal Grandmother     Cataracts Maternal Grandmother     Glaucoma Other     Cancer No family hx of     Cerebrovascular Disease No family hx of     Thyroid Disease No family hx of     Macular Degeneration No family hx of          Current Outpatient Medications   Medication Sig Dispense Refill    acetaminophen (MAPAP ARTHRITIS PAIN) 650 MG CR tablet Take 1 tablet (650 mg) by mouth every 8 hours as needed for mild pain or fever. 60 tablet 1    acyclovir (ZOVIRAX) 5 % external cream APPLY TOPICALLY 5 TIMES A DAY AS NEEDED FOR COLD SORE 5 g 3    ketoconazole (NIZORAL) 2 % external shampoo Use every 1-2 days when flared. Leave in few minutes before rinsing. Use twice weekly to prevent flares. 120 mL 11    tretinoin (RETIN-A) 0.025 % external cream Use every night to all areas of acne 45 g 11     No Known Allergies      Review of Systems  Constitutional, HEENT, cardiovascular, pulmonary, gi and gu systems are negative, except as otherwise noted.    "  Objective    Exam  There were no vitals taken for this visit.   Estimated body mass index is 31.98 kg/m  as calculated from the following:    Height as of 10/1/24: 1.703 m (5' 7.03\").    Weight as of 10/1/24: 92.7 kg (204 lb 6 oz).    Physical Exam  GENERAL: alert and no distress  EYES: Eyes grossly normal to inspection, PERRL and conjunctivae and sclerae normal  HENT: ear canals and TM's normal, nose and mouth without ulcers or lesions  NECK: no adenopathy, no asymmetry, masses, or scars  RESP: lungs clear to auscultation - no rales, rhonchi or wheezes  CV: regular rate and rhythm, normal S1 S2, no S3 or S4, no murmur, click or rub, no peripheral edema  ABDOMEN: soft, nontender, no hepatosplenomegaly, no masses and bowel sounds normal  MS: no gross musculoskeletal defects noted, no edema  SKIN: no suspicious lesions or rashes  NEURO: Normal strength and tone, mentation intact and speech normal  BACK: no CVA tenderness, no paralumbar tenderness  PSYCH: mentation appears normal, affect normal/bright  LYMPH: no cervical, supraclavicular, axillary, or inguinal adenopathy  : Normal male external genitalia. Dieter stage 5,  both testes descended, no hernia.        Vision Screen  Vision Screen Details  Reason Vision Screen Not Completed: Patient had exam in last 12 months    Hearing Screen           Signed Electronically by: Jon Betancourt MD    "

## 2024-12-16 DIAGNOSIS — Z86.19 HX OF COLD SORES: ICD-10-CM

## 2024-12-17 RX ORDER — ACYCLOVIR 50 MG/G
CREAM TOPICAL
Qty: 5 G | Refills: 3 | Status: SHIPPED | OUTPATIENT
Start: 2024-12-17

## 2025-05-07 ENCOUNTER — ANCILLARY PROCEDURE (OUTPATIENT)
Dept: GENERAL RADIOLOGY | Facility: CLINIC | Age: 20
End: 2025-05-07
Attending: INTERNAL MEDICINE
Payer: COMMERCIAL

## 2025-05-07 ENCOUNTER — OFFICE VISIT (OUTPATIENT)
Dept: FAMILY MEDICINE | Facility: CLINIC | Age: 20
End: 2025-05-07
Payer: COMMERCIAL

## 2025-05-07 VITALS
RESPIRATION RATE: 18 BRPM | HEIGHT: 67 IN | DIASTOLIC BLOOD PRESSURE: 66 MMHG | BODY MASS INDEX: 33.9 KG/M2 | TEMPERATURE: 98.8 F | HEART RATE: 54 BPM | SYSTOLIC BLOOD PRESSURE: 127 MMHG | WEIGHT: 216 LBS | OXYGEN SATURATION: 98 %

## 2025-05-07 DIAGNOSIS — Z11.4 SCREENING FOR HIV (HUMAN IMMUNODEFICIENCY VIRUS): Primary | ICD-10-CM

## 2025-05-07 DIAGNOSIS — M25.512 ACUTE PAIN OF BOTH SHOULDERS: ICD-10-CM

## 2025-05-07 DIAGNOSIS — M25.511 ACUTE PAIN OF BOTH SHOULDERS: ICD-10-CM

## 2025-05-07 DIAGNOSIS — Z11.59 NEED FOR HEPATITIS C SCREENING TEST: ICD-10-CM

## 2025-05-07 PROCEDURE — 73030 X-RAY EXAM OF SHOULDER: CPT | Mod: TC | Performed by: STUDENT IN AN ORGANIZED HEALTH CARE EDUCATION/TRAINING PROGRAM

## 2025-05-07 PROCEDURE — 3074F SYST BP LT 130 MM HG: CPT | Performed by: INTERNAL MEDICINE

## 2025-05-07 PROCEDURE — 99213 OFFICE O/P EST LOW 20 MIN: CPT | Performed by: INTERNAL MEDICINE

## 2025-05-07 PROCEDURE — 3078F DIAST BP <80 MM HG: CPT | Performed by: INTERNAL MEDICINE

## 2025-05-07 PROCEDURE — 1125F AMNT PAIN NOTED PAIN PRSNT: CPT | Performed by: INTERNAL MEDICINE

## 2025-05-07 ASSESSMENT — PAIN SCALES - GENERAL: PAINLEVEL_OUTOF10: MODERATE PAIN (4)

## 2025-05-07 NOTE — PROGRESS NOTES
"  Assessment & Plan   Problem List Items Addressed This Visit    None  Visit Diagnoses         Screening for HIV (human immunodeficiency virus)    -  Primary      Need for hepatitis C screening test          Acute pain of both shoulders        Relevant Orders    REVIEW OF HEALTH MAINTENANCE PROTOCOL ORDERS (Completed)    XR Shoulder Left G/E 3 Views    XR Shoulder Right G/E 3 Views    Physical Therapy  Referral                  BMI  Estimated body mass index is 33.53 kg/m  as calculated from the following:    Height as of this encounter: 1.709 m (5' 7.3\").    Weight as of this encounter: 98 kg (216 lb).   Weight management plan: Discussed healthy diet and exercise guidelines        Conner Saravia is a 19 year old, presenting for the following health issues:  Shoulder (Both shoulders; left is worse)        5/7/2025     1:55 PM   Additional Questions   Roomed by Nany     History of Present Illness       Reason for visit:  Injury To Shoulder  Symptom onset:  3-7 days ago   He is taking medications regularly.        Both L>R  -  last Friday - [employer] air - landed wrong   Left one got numb and hurt longer   Range of motion limited getting better   Catia   Fell wrong on the arm   Pop in the left arm at that time   Ac joint and in the front               Review of Systems  Constitutional, HEENT, cardiovascular, pulmonary, gi and gu systems are negative, except as otherwise noted.      Objective    /66 (BP Location: Left arm, Patient Position: Sitting, Cuff Size: Adult Large)   Pulse 54   Temp 98.8  F (37.1  C) (Temporal)   Resp 18   Ht 1.709 m (5' 7.3\")   Wt 98 kg (216 lb)   SpO2 98%   BMI 33.53 kg/m    Body mass index is 33.53 kg/m .  Physical Exam   GENERAL: alert and no distress  EYES: Eyes grossly normal to inspection, PERRL and conjunctivae and sclerae normal  HENT: ear canals and TM's normal, nose and mouth without ulcers or lesions  NECK: no adenopathy, no asymmetry, masses, or " scars  RESP: lungs clear to auscultation - no rales, rhonchi or wheezes  CV: regular rate and rhythm, normal S1 S2, no S3 or S4, no murmur, click or rub, no peripheral edema  ABDOMEN: soft, nontender, no hepatosplenomegaly, no masses and bowel sounds normal  MS: AC joint tenderness, good range of motion with tenderness in AC joint   Rotator cuffs intact bilateral       SKIN: no suspicious lesions or rashes  NEURO: Normal strength and tone, mentation intact and speech normal  BACK: no CVA tenderness, no paralumbar tenderness  PSYCH: mentation appears normal, affect normal/bright  LYMPH: no cervical, supraclavicular, axillary, or inguinal adenopathy    No results found for any visits on 05/07/25.        Signed Electronically by: Jon Betancourt MD

## 2025-05-08 ENCOUNTER — RESULTS FOLLOW-UP (OUTPATIENT)
Dept: FAMILY MEDICINE | Facility: CLINIC | Age: 20
End: 2025-05-08

## 2025-08-04 ENCOUNTER — TELEPHONE (OUTPATIENT)
Dept: FAMILY MEDICINE | Facility: CLINIC | Age: 20
End: 2025-08-04
Payer: COMMERCIAL